# Patient Record
(demographics unavailable — no encounter records)

---

## 2025-03-18 NOTE — PHYSICAL EXAM
[Not Examined] : not examined [Normal] : The patient is moving all extremities spontaneously without any gross neurologic deficits. They walk with a fluid nonantalgic gait. There are equal and symmetric deep tendon reflexes in the upper and lower extremities bilaterally. There is gross intact sensation to soft and light touch in the bilateral upper and lower extremities [de-identified] :  ISLT Intact Motor

## 2025-03-18 NOTE — HISTORY OF PRESENT ILLNESS
[FreeTextEntry1] : 15 y/o male here with bl ankle pain hurt on 3/6/25 pain with swelling after running. Began playing lacrosse recently.

## 2025-03-18 NOTE — PHYSICAL EXAM
[Not Examined] : not examined [Normal] : The patient is moving all extremities spontaneously without any gross neurologic deficits. They walk with a fluid nonantalgic gait. There are equal and symmetric deep tendon reflexes in the upper and lower extremities bilaterally. There is gross intact sensation to soft and light touch in the bilateral upper and lower extremities [de-identified] :  ISLT Intact Motor

## 2025-03-18 NOTE — HISTORY OF PRESENT ILLNESS
[FreeTextEntry1] : 13 y/o male here with bl ankle pain hurt on 3/6/25 pain with swelling after running. Began playing lacrosse recently.

## 2025-03-18 NOTE — ASSESSMENT
[FreeTextEntry1] : Pain at the insertion of the Achilles. Can continue playing but resting for one month would make the pain subside quicker. Focusing on stretching before and after, NSAIDs, and icing can help. Gave prescription for PT. Follow up PRN.

## 2025-03-18 NOTE — PHYSICAL EXAM
[Not Examined] : not examined [Normal] : The patient is moving all extremities spontaneously without any gross neurologic deficits. They walk with a fluid nonantalgic gait. There are equal and symmetric deep tendon reflexes in the upper and lower extremities bilaterally. There is gross intact sensation to soft and light touch in the bilateral upper and lower extremities [de-identified] :  ISLT Intact Motor